# Patient Record
(demographics unavailable — no encounter records)

---

## 2025-04-21 NOTE — HISTORY OF PRESENT ILLNESS
[FreeTextEntry1] : Establish care, CPE [de-identified] : 40 yo female presents to establish care, CPE.  Patient recently established care with derm about 2 weeks ago, FBSE was OK. Patient has been dealing with hair shedding for past 2 years. Feels like she has significantly less hair than in past. Started on topical shampoo and steroid drops, unsure if has helped.  Patient thinks last pap smear was about 3 years ago - WNL. Plans on making gyn appt, does not need referral.  Patient also c/o L knee pain, onset about 1 year ago. States she hears a popping sound frequently. Used to run, now knee feels unstable. Often injures knee with running. Patient also c/o neck pain. Works as , often looks down.

## 2025-04-21 NOTE — PLAN
[FreeTextEntry1] : HCM -F/u bloodwork, call patient with results -Patient due for pap smear for cervical cancer screening, states she has scheduled upcoming appt -Patient aware at age 40 she will be due for mammogram/breast cancer screening - has upcoming gyn appt -F/u derm annually for FBSE/skin cancer screening -Declines STI screening

## 2025-04-21 NOTE — REVIEW OF SYSTEMS
[Hair Changes] : hair changes [Dizziness] : dizziness [Negative] : Psychiatric [Headache] : no headache [FreeTextEntry9] : L knee pain, knee pain [de-identified] : intermittent

## 2025-04-21 NOTE — HEALTH RISK ASSESSMENT
[Yes] : Yes [Monthly or less (1 pt)] : Monthly or less (1 point) [1 or 2 (0 pts)] : 1 or 2 (0 points) [Never (0 pts)] : Never (0 points) [No] : In the past 12 months have you used drugs other than those required for medical reasons? No [0] : 2) Feeling down, depressed, or hopeless: Not at all (0) [PHQ-2 Negative - No further assessment needed] : PHQ-2 Negative - No further assessment needed [Patient reported PAP Smear was normal] : Patient reported PAP Smear was normal [None] : None [Alone] : lives alone [Employed] : employed [Fully functional (bathing, dressing, toileting, transferring, walking, feeding)] : Fully functional (bathing, dressing, toileting, transferring, walking, feeding) [Fully functional (using the telephone, shopping, preparing meals, housekeeping, doing laundry, using] : Fully functional and needs no help or supervision to perform IADLs (using the telephone, shopping, preparing meals, housekeeping, doing laundry, using transportation, managing medications and managing finances) [Never] : Never [Audit-CScore] : 1 [de-identified] : yoga, dance, pilates [de-identified] : high sugar, feels like lacking [[id number]] : 0 [HIV test declined] : HIV test declined [Hepatitis C test declined] : Hepatitis C test declined [Change in mental status noted] : No change in mental status noted [PapSmearDate] : 01/21 [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

## 2025-05-22 NOTE — PHYSICAL EXAM
[de-identified] : L knee with effusion over the middle knee and infrapatellar region. medial joint line tenderness and ttp at MCL insertion. Heasq9p laxity with valgus stress at 30 deg knee flexion pain with sarahy and valgus stressing. Stable lachman, anterior and posterior drawer, and stable to varus stress. No patellar instability noted. SILT 5/5 knee extension, flexion, ankle df and pf 2+ DP, PT pulses [de-identified] : XR of bilateral knees, 4 views: no evidence of fracture, dislocation or loosebodies. mild knee effusion noted on L knee. Minimal medial sided joint space narrowing noted on R knee.

## 2025-05-22 NOTE — HISTORY OF PRESENT ILLNESS
[de-identified] : 40F healthy with acute L knee pain for 2 days. Patient states she experienced a sharp pain at the medial knee while line dancing on tuesday. She was able to weightbearing and finish her dance session but the pain progressed that night and now she was pain with walking and bending her knee. 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 She also runs often. Denies any numbness or tingling. Feels like her knee intermittently garo and clicks when she walks long distances.

## 2025-05-22 NOTE — DISCUSSION/SUMMARY
[de-identified] : 40F with medial sided acute knee pain that occured two days ago while line dancing. Exam concerning for possible MCL sprain/tear and medial meniscus injury. Script given today for an MRI of the L knee. Patient will f/u with us once the MRI is complete to discuss results. WBAT LLE, start alternating doses of tylenol and NSAIDS, RICE therapy for pain relief.